# Patient Record
Sex: MALE | Employment: FULL TIME | URBAN - METROPOLITAN AREA
[De-identification: names, ages, dates, MRNs, and addresses within clinical notes are randomized per-mention and may not be internally consistent; named-entity substitution may affect disease eponyms.]

---

## 2017-05-26 ENCOUNTER — HOSPITAL ENCOUNTER (OUTPATIENT)
Dept: CT IMAGING | Facility: HOSPITAL | Age: 47
Discharge: HOME | End: 2017-05-26
Attending: INTERNAL MEDICINE

## 2017-05-26 DIAGNOSIS — Z13.9 SCREENING PROCEDURE: ICD-10-CM

## 2017-06-12 ENCOUNTER — TELEPHONE (OUTPATIENT)
Dept: INTERNAL MEDICINE CLINIC | Facility: CLINIC | Age: 47
End: 2017-06-12

## 2017-06-12 NOTE — TELEPHONE ENCOUNTER
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 Removed Dr. Jonna Mcmahan name (ok per Donah Dance for me todo it.) DINA. Thank you.

## 2017-06-12 NOTE — TELEPHONE ENCOUNTER
----- Message from [facility] sent at 5/26/2017  1:51 PM CDT -----  Regarding: Assigned new patient for CT Heart Scan test   Dear Dr. Alia Romero    I wanted to inform you that  Mr. Deepali Helms had a CT  calcium heart scan  on 05/26/17 and was assigned to

## 2017-06-12 NOTE — TELEPHONE ENCOUNTER
See note below. I am listed as his pcp but he has never seen me. Does he wish to follow up with us for his primary care needs? If so he should make an appt to establish care.